# Patient Record
Sex: MALE | Race: WHITE | NOT HISPANIC OR LATINO | URBAN - METROPOLITAN AREA
[De-identification: names, ages, dates, MRNs, and addresses within clinical notes are randomized per-mention and may not be internally consistent; named-entity substitution may affect disease eponyms.]

---

## 2019-03-06 VITALS
DIASTOLIC BLOOD PRESSURE: 70 MMHG | SYSTOLIC BLOOD PRESSURE: 149 MMHG | HEART RATE: 80 BPM | WEIGHT: 169.09 LBS | OXYGEN SATURATION: 100 % | RESPIRATION RATE: 16 BRPM | TEMPERATURE: 98 F | HEIGHT: 65 IN

## 2019-03-06 NOTE — H&P ADULT - NSHPLABSRESULTS_GEN_ALL_CORE
EKG: NSR @ 75bpm, no ST elevations or T wave inversions noted. 14.0   7.68  )-----------( 273      ( 07 Mar 2019 07:35 )             42.1     137  |  99  |  15  ----------------------------<  263<H>  5.0   |  26  |  1.07    Ca    9.9      07 Mar 2019 07:35    TPro  7.4  /  Alb  4.7  /  TBili  0.5  /  DBili  <0.2  /  AST  18  /  ALT  29  /  AlkPhos  49  03-07    PT/INR - ( 07 Mar 2019 07:35 )   PT: 11.1 sec;   INR: 0.98       PTT - ( 07 Mar 2019 07:35 )  PTT:26.4 sec    CARDIAC MARKERS ( 07 Mar 2019 07:35 )  x     / x     / 27 U/L / x     / <1.0 ng/mL    Hemoglobin A1C, Whole Blood: 6.6 % (03-07-19 @ 07:35)    EKG: NSR @ 75bpm, no ST elevations or T wave inversions noted.

## 2019-03-06 NOTE — H&P ADULT - LYMPH NODES
Assessment    1  Encounter for preventive health examination (V70 0) (Z00 00)   2  Flu vaccine need (V04 81) (Z23)    Plan  Anxiety    · ALPRAZolam 0 25 MG Oral Tablet; 1/2 -1 TAB BY MOUTH ONCE or TWICE  AS  NEED   · (Q) TSH, 3RD GENERATION W/REFLEX TO FT4; Status:Active; Requested  for:08Nov2016;   Chondromalacia of right patella, Right knee pain    · * XR KNEE 3 VW RIGHT NON INJURY; Status:Active; Requested for:08Nov2016;   Flu vaccine need    · Stop: Fluzone Quadrivalent Intramuscular Suspension  Lipid screening    · (Q) LIPID PANEL WITH DIRECT LDL; Status:Active; Requested for:08Nov2016;   Nonfunctioning kidney, Renal Dysplasia Of The Left Kidney    · (Q) CBC (INCLUDES DIFF/PLT) (REFL); Status:Active; Requested for:08Nov2016;    · (Q) COMPREHENSIVE METABOLIC PANEL W/O eGFR; Status:Active; Requested  for:08Nov2016;    · (Q) URINALYSIS MICROSCOPIC; Status:Active; Requested for:08Nov2016;   Right knee pain    · 1 - Tish Artis DO (Orthopedic Surgery) Physician Referral  Consult re ongoing  right knee pain  Status: Active  Requested for: [id number]  Care Summary provided  : Yes    Discussion/Summary  health maintenance visit cervical cancer screening is current Breast cancer screening: mammogram is current  Colorectal cancer screening: colorectal cancer screening is current and 2016 redo 5 yrs  Screening lab work includes hemoglobin, glucose, lipid profile, thyroid function testing and urinalysis  The will do flu shot w work had Tdap 2015  She was advised to be evaluated by an optometrist and a dentist      She is in today for her physical, she continues ttodo well  She can use alprazolam sparingly as is    She has been limited in her exercise due to right knee pain, try to do strengthening for quads, can see orthopedist if wants  She will do fasting blood work along with urinalysis including screening lipids  Past history nonfunctioning left kidney with remote right kidney surgery      She will continue to see her gynecologist for routine care, had colonoscopy earlier this year which was fine and was advised to redo in 5 years    She will continue to try to watch a healthy diet, she is trying to limit portions, watch carbohydrate intake  She does experience some snoring no excessive daytime somnolence, see no indication for sleep study at present    We should see her every year or so, call us sooner as needed  Chief Complaint  Physical      History of Present Illness  HPI: She is in today for her regular physical, I last saw her about a year and a half ago  Overall she has been feeling well, other than some ongoing subpatellar right knee pain at times which does limit her exercise  She rarely uses Xanax as needed for anxiety, does need refill  Becoming grandmother in may      Review of Systems    Constitutional: no fever, not feeling poorly, no recent weight gain, no chills, not feeling tired and no recent weight loss  Eyes: no eyesight problems  ENT: no earache, no nosebleeds, no sore throat and no hoarseness  Cardiovascular: No complaints of slow heart rate, no fast heart rate, no chest pain, no palpitations, no leg claudication, no lower extremity edema  Respiratory: No complaints of shortness of breath, no wheezing, no cough, no SOB on exertion, no orthopnea, no PND  Gastrointestinal: No reflux, but No complaints of abdominal pain, no constipation, no nausea or vomiting, no diarrhea, no bloody stools  Genitourinary: Sees gynecology, but no dysuria  Musculoskeletal: Ongoing pain right knee subpatellar at times  Integumentary: No complaints of skin rash or lesions, no itching, no skin wounds, no breast pain or lump  Neurological: no headache, no confusion, no dizziness, no limb weakness, no convulsions, no fainting and no difficulty walking  Psychiatric: no anxiety, no sleep disturbances and no depression  Endocrine: hot flashes     Hematologic/Lymphatic: No complaints of swollen glands, no swollen glands in the neck, does not bleed easily, does not bruise easily  Active Problems    1  Anxiety (300 00) (F41 9)   2  Fatigue (780 79) (R53 83)   3  History of Kidney Surgery   4  Other specified menopausal and perimenopausal disorders (627 8) (N95 8)   5  Renal Dysplasia Of The Left Kidney (753 15)   6  Right knee pain (719 46) (M25 561)    Past Medical History    · Renal Dysplasia Of The Left Kidney (753 15)    Surgical History    · History of Kidney Surgery   · History of Wrist Surgery    Social History    · Being A Social Drinker   · Employment History: (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   · Former smoker (V15 82) (F14 534)   · Marital History - Currently     Current Meds   1  ALPRAZolam 0 25 MG Oral Tablet; 1/2 -1 TAB BY MOUTH ONCE or TWICE  AS NEED; Therapy: [id number] to (Evaluate:[id number]); Last Rx:16Apr2015 Ordered    Allergies    1  Sulfa Drugs    Vitals   Recorded: 61MHH1819 62:62WD   Systolic 724   Diastolic 72   Heart Rate 76   Height 5 ft 5 in   Weight 182 lb    BMI Calculated 30 29   BSA Calculated 1 9     Physical Exam    Constitutional   General appearance: No acute distress, well appearing and well nourished  overweight  Head and Face   Head and face: Normal   flushed cheeks  Palpation of the face and sinuses: No sinus tenderness  Eyes   Conjunctiva and lids: No swelling, erythema or discharge  Ears, Nose, Mouth, and Throat   Otoscopic examination: Tympanic membranes translucent with normal light reflex  Canals patent without erythema  Hearing: Normal     Lips, teeth, and gums: Normal, good dentition  Oropharynx: Normal with no erythema, edema, exudate or lesions  Neck   Neck: Supple, symmetric, trachea midline, no masses  Thyroid: Normal, no thyromegaly  Pulmonary   Auscultation of lungs: Clear to auscultation  Cardiovascular   Auscultation of heart: Normal rate and rhythm, normal S1 and S2, no murmurs  Carotid pulses: 2+ bilaterally    No edema    Lymphatic   Palpation of lymph nodes in neck: No lymphadenopathy  Musculoskeletal   Gait and station: Normal   Essentially nontender right knee with good range of motion, no instability, no redness or warmth  Neurologic   Cortical function: Normal mental status  Coordination: Normal finger to nose and heel to shin  Psychiatric   Judgment and insight: Normal     Orientation to person, place, and time: Normal     Recent and remote memory: Intact  Mood and affect: Normal        Results/Data  PHQ-2 Adult Depression Screening [id number] 09:47AM User, s     Test Name Result Flag Reference   PHQ-2 Adult Depression Score 0     Over the last two weeks, how often have you been bothered by any of the following problems? Little interest or pleasure in doing things: Not at all - 0  Feeling down, depressed, or hopeless: Not at all - 0   PHQ-2 Adult Depression Screening Negative         Health Management  Health Maintenance   COLONOSCOPY; every 10 years; Last [id number]; Next Due: 56MGM5927;  Active    Signatures   Electronically signed by : Shana Nelson DO; Nov 8 2016 12:24PM EST                       (Author) not examined

## 2019-03-06 NOTE — H&P ADULT - PMH
Diabetes mellitus type 2, noninsulin dependent    Diastolic dysfunction    Hyperlipidemia Diabetes mellitus type 2, noninsulin dependent    Diastolic dysfunction    HTN (hypertension)    Hyperlipidemia

## 2019-03-06 NOTE — H&P ADULT - ASSESSMENT
62 y/o Frisian speaking Male, former Smoker, with a PMHx of HTN, HLD, DM Type II, and Diastolic Dysfunction who presented to his Cardiologist, Dr. Hammonds, asymptomatic with subsequent abnormal Stress Echo 1/15/2019: revealed hypokinesis in apical lateral, septal and inferior wall, 2 mm horizontal ST depression in lead II, III, AVF, 1.5 mm ST depression in V3-V5, V6. During stress test patient had PVC, Bigeminy, and Trigeminy. Echocardiogram 1/14/2019: EF: 45-50%, trace to mild MR, trace TR, no pericardial effusion. In light of patient's risk factors and abnormal Stress Echo patient now presents for recommended cardiac cath with possible intervention.   H/H /, patient denies bleeding, GI bleeding, hematemesis, hematuria, BRBPR, and melena. Patient reports compliance with daily home Brilinta 60mg daily and Aspirin 81mg daily with last dose yesterday, ordered Brilinta 180mg and Aspirin 325mg pre-Cath.    Cr. , euvolemic on exam with history of Diastolic Dysfunction by Echo, IV hydration 9% NS @ 50cc/hr ordered pre-Cath.    ASA Class III    Mallampati Class III  Started consent with [place]  but patient ultimately refused stating that he preferred wife to translate  Risks & benefits of procedure and alternative therapy have been explained to the patient including but not limited to: allergic reaction, bleeding with possible need for blood transfusion, infection, renal and vascular compromise, limb damage, arrhythmia, stroke, vessel dissection/perforation, Myocardial Infarction, emergent CABG. Informed consent obtained and in chart.    Patient a candidate for sedation: Yes    Sedation Type: Moderate 64 y/o Belarusian speaking Male, former Smoker, with a PMHx of HTN, HLD, DM Type II, and Diastolic Dysfunction who presented to his Cardiologist, Dr. Hammonds, asymptomatic with subsequent abnormal Stress Echo 1/15/2019: revealed hypokinesis in apical lateral, septal and inferior wall, 2 mm horizontal ST depression in lead II, III, AVF, 1.5 mm ST depression in V3-V5, V6. During stress test patient had PVC, Bigeminy, and Trigeminy. Echocardiogram 1/14/2019: EF: 45-50%, trace to mild MR, trace TR, no pericardial effusion. In light of patient's risk factors and abnormal Stress Echo patient now presents for recommended cardiac cath with possible intervention.   H/H 14/42.1, patient denies bleeding, GI bleeding, hematemesis, hematuria, BRBPR, and melena. Patient reports compliance with daily home Brilinta 60mg daily and Aspirin 81mg daily with last dose yesterday, ordered Brilinta 180mg and Aspirin 325mg pre-Cath.    Cr. 1.07, euvolemic on exam with history of Diastolic Dysfunction by Echo, IV hydration 9% NS @ 50cc/hr ordered pre-Cath.    ASA Class III    Mallampati Class III  Started consent with [place]  but patient ultimately refused stating that he preferred wife to translate  Risks & benefits of procedure and alternative therapy have been explained to the patient including but not limited to: allergic reaction, bleeding with possible need for blood transfusion, infection, renal and vascular compromise, limb damage, arrhythmia, stroke, vessel dissection/perforation, Myocardial Infarction, emergent CABG. Informed consent obtained and in chart.    Patient a candidate for sedation: Yes    Sedation Type: Moderate

## 2019-03-06 NOTE — H&P ADULT - NEGATIVE NEUROLOGICAL SYMPTOMS
no syncope/no headache/no difficulty walking/no focal seizures/no tremors/no vertigo/no weakness/no generalized seizures/no paresthesias

## 2019-03-06 NOTE — H&P ADULT - NEGATIVE CARDIOVASCULAR SYMPTOMS
no peripheral edema/no dyspnea on exertion/no orthopnea/no paroxysmal nocturnal dyspnea/no palpitations/no chest pain

## 2019-03-06 NOTE — H&P ADULT - NSHPLANGTRANSLATORFT_GEN_A_CORE
[place]  # Started consent with [place]  but patient ultimately refused stating that he preferred wife to translate

## 2019-03-06 NOTE — H&P ADULT - HISTORY OF PRESENT ILLNESS
History obtained from wife Adrianna Curran (reliable) and Dr. Hammonds office note   PATIENT'S WIFE WILL BRING MEDS PLEASE UPDATE  63 Kiswahili speaking M former smoker with PMHx Hyperlipidemia, DM Type II, Diastolic Dysfunction who presented to cardiologist Dr. Hammonds for follow-up. Per patient's wife patient has been feeling well and denies C/P, SOB, palpitations, dizziness, diaphoresis, N/V, syncope, LE edema, PND or orthopnea. Stress Echo 1/15/19 revealed hypokinesis in apical lateral, septal and inferior wall, 2 mm horizontal ST depression in lead II, III, AVF, 1.5 mm ST depression in V3-V5, V6. During stress test patient had PVC, Bigeminy, and Trigeminy. Echocardiogram 1/14/19 revealed trace to mild MR, trace TR, LVEF 45-50%, no pericardial effusion. In light of patient's risk factors and abnormal stress echo patient now presents for recommended cardiac cath with possible intervention. 64 y/o Frisian speaking Male, former smoker, with PMHx Hyperlipidemia, DM Type II, Diastolic Dysfunction who presented to cardiologist Dr. Hammonds for follow-up. Per patient's wife patient has been feeling well and denies C/P, SOB, palpitations, dizziness, diaphoresis, N/V, syncope, LE edema, PND or orthopnea. Stress Echo 1/15/19 revealed hypokinesis in apical lateral, septal and inferior wall, 2 mm horizontal ST depression in lead II, III, AVF, 1.5 mm ST depression in V3-V5, V6. During stress test patient had PVC, Bigeminy, and Trigeminy. Echocardiogram 1/14/19 revealed trace to mild MR, trace TR, LVEF 45-50%, no pericardial effusion. In light of patient's risk factors and abnormal stress echo patient now presents for recommended cardiac cath with possible intervention. 64 y/o Lithuanian speaking Male, former Smoker, with no significant FHx and a PMHx of HTN, HLD, DM Type II, and Diastolic Dysfunction who presented to his Cardiologist, Dr. Hammonds, for follow-up. Patient reports feeling well and denies chest pain, SOB, palpitations, dizziness, diaphoresis, N/V, syncope, LE edema, PND or orthopnea. Stress Echo 1/15/2019: revealed hypokinesis in apical lateral, septal and inferior wall, 2 mm horizontal ST depression in lead II, III, AVF, 1.5 mm ST depression in V3-V5, V6. During stress test patient had PVC, Bigeminy, and Trigeminy. Echocardiogram 1/14/2019: EF: 45-50%, trace to mild MR, trace TR, no pericardial effusion. In light of patient's risk factors and abnormal Stress Echo patient now presents for recommended cardiac cath with possible intervention.

## 2019-03-07 ENCOUNTER — INPATIENT (INPATIENT)
Facility: HOSPITAL | Age: 64
LOS: 0 days | Discharge: HOME | DRG: 247 | End: 2019-03-08
Attending: INTERNAL MEDICINE | Admitting: INTERNAL MEDICINE
Payer: COMMERCIAL

## 2019-03-07 LAB
ALBUMIN SERPL ELPH-MCNC: 4.7 G/DL — SIGNIFICANT CHANGE UP (ref 3.3–5)
ALP SERPL-CCNC: 49 U/L — SIGNIFICANT CHANGE UP (ref 40–120)
ALT FLD-CCNC: 29 U/L — SIGNIFICANT CHANGE UP (ref 10–45)
ANION GAP SERPL CALC-SCNC: 12 MMOL/L — SIGNIFICANT CHANGE UP (ref 5–17)
APTT BLD: 26.4 SEC — LOW (ref 27.5–36.3)
AST SERPL-CCNC: 18 U/L — SIGNIFICANT CHANGE UP (ref 10–40)
BASOPHILS # BLD AUTO: 0.01 K/UL — SIGNIFICANT CHANGE UP (ref 0–0.2)
BASOPHILS NFR BLD AUTO: 0.1 % — SIGNIFICANT CHANGE UP (ref 0–2)
BILIRUB DIRECT SERPL-MCNC: <0.2 MG/DL — SIGNIFICANT CHANGE UP (ref 0–0.2)
BILIRUB INDIRECT FLD-MCNC: >0.3 MG/DL — SIGNIFICANT CHANGE UP (ref 0.2–1)
BILIRUB SERPL-MCNC: 0.5 MG/DL — SIGNIFICANT CHANGE UP (ref 0.2–1.2)
BUN SERPL-MCNC: 15 MG/DL — SIGNIFICANT CHANGE UP (ref 7–23)
CALCIUM SERPL-MCNC: 9.9 MG/DL — SIGNIFICANT CHANGE UP (ref 8.4–10.5)
CHLORIDE SERPL-SCNC: 99 MMOL/L — SIGNIFICANT CHANGE UP (ref 96–108)
CHOLEST SERPL-MCNC: 220 MG/DL — HIGH (ref 10–199)
CK MB CFR SERPL CALC: <1 NG/ML — SIGNIFICANT CHANGE UP (ref 0–6.7)
CK SERPL-CCNC: 27 U/L — LOW (ref 30–200)
CO2 SERPL-SCNC: 26 MMOL/L — SIGNIFICANT CHANGE UP (ref 22–31)
CREAT SERPL-MCNC: 1.07 MG/DL — SIGNIFICANT CHANGE UP (ref 0.5–1.3)
CRP SERPL-MCNC: 0.29 MG/DL — SIGNIFICANT CHANGE UP (ref 0–0.4)
EOSINOPHIL # BLD AUTO: 0.07 K/UL — SIGNIFICANT CHANGE UP (ref 0–0.5)
EOSINOPHIL NFR BLD AUTO: 0.9 % — SIGNIFICANT CHANGE UP (ref 0–6)
GLUCOSE SERPL-MCNC: 263 MG/DL — HIGH (ref 70–99)
HBA1C BLD-MCNC: 6.6 % — HIGH (ref 4–5.6)
HCT VFR BLD CALC: 42.1 % — SIGNIFICANT CHANGE UP (ref 39–50)
HDLC SERPL-MCNC: 45 MG/DL — SIGNIFICANT CHANGE UP
HGB BLD-MCNC: 14 G/DL — SIGNIFICANT CHANGE UP (ref 13–17)
IMM GRANULOCYTES NFR BLD AUTO: 0.3 % — SIGNIFICANT CHANGE UP (ref 0–1.5)
INR BLD: 0.98 — SIGNIFICANT CHANGE UP (ref 0.88–1.16)
LIPID PNL WITH DIRECT LDL SERPL: 145 MG/DL — HIGH
LYMPHOCYTES # BLD AUTO: 2.34 K/UL — SIGNIFICANT CHANGE UP (ref 1–3.3)
LYMPHOCYTES # BLD AUTO: 30.5 % — SIGNIFICANT CHANGE UP (ref 13–44)
MCHC RBC-ENTMCNC: 30.7 PG — SIGNIFICANT CHANGE UP (ref 27–34)
MCHC RBC-ENTMCNC: 33.3 GM/DL — SIGNIFICANT CHANGE UP (ref 32–36)
MCV RBC AUTO: 92.3 FL — SIGNIFICANT CHANGE UP (ref 80–100)
MONOCYTES # BLD AUTO: 0.55 K/UL — SIGNIFICANT CHANGE UP (ref 0–0.9)
MONOCYTES NFR BLD AUTO: 7.2 % — SIGNIFICANT CHANGE UP (ref 2–14)
NEUTROPHILS # BLD AUTO: 4.69 K/UL — SIGNIFICANT CHANGE UP (ref 1.8–7.4)
NEUTROPHILS NFR BLD AUTO: 61 % — SIGNIFICANT CHANGE UP (ref 43–77)
NRBC # BLD: 0 /100 WBCS — SIGNIFICANT CHANGE UP (ref 0–0)
PLATELET # BLD AUTO: 273 K/UL — SIGNIFICANT CHANGE UP (ref 150–400)
POTASSIUM SERPL-MCNC: 5 MMOL/L — SIGNIFICANT CHANGE UP (ref 3.5–5.3)
POTASSIUM SERPL-SCNC: 5 MMOL/L — SIGNIFICANT CHANGE UP (ref 3.5–5.3)
PROT SERPL-MCNC: 7.4 G/DL — SIGNIFICANT CHANGE UP (ref 6–8.3)
PROTHROM AB SERPL-ACNC: 11.1 SEC — SIGNIFICANT CHANGE UP (ref 10–12.9)
RBC # BLD: 4.56 M/UL — SIGNIFICANT CHANGE UP (ref 4.2–5.8)
RBC # FLD: 11.9 % — SIGNIFICANT CHANGE UP (ref 10.3–14.5)
SODIUM SERPL-SCNC: 137 MMOL/L — SIGNIFICANT CHANGE UP (ref 135–145)
TOTAL CHOLESTEROL/HDL RATIO MEASUREMENT: 4.9 RATIO — SIGNIFICANT CHANGE UP (ref 3.4–9.6)
TRIGL SERPL-MCNC: 151 MG/DL — HIGH (ref 10–149)
WBC # BLD: 7.68 K/UL — SIGNIFICANT CHANGE UP (ref 3.8–10.5)
WBC # FLD AUTO: 7.68 K/UL — SIGNIFICANT CHANGE UP (ref 3.8–10.5)

## 2019-03-07 PROCEDURE — 99222 1ST HOSP IP/OBS MODERATE 55: CPT | Mod: 25

## 2019-03-07 PROCEDURE — 93458 L HRT ARTERY/VENTRICLE ANGIO: CPT | Mod: 26,XU

## 2019-03-07 PROCEDURE — 93571 IV DOP VEL&/PRESS C FLO 1ST: CPT | Mod: 26

## 2019-03-07 PROCEDURE — 92920 PRQ TRLUML C ANGIOP 1ART&/BR: CPT | Mod: RI

## 2019-03-07 RX ORDER — SITAGLIPTIN 50 MG/1
1 TABLET, FILM COATED ORAL
Qty: 0 | Refills: 0 | COMMUNITY

## 2019-03-07 RX ORDER — INSULIN LISPRO 100/ML
VIAL (ML) SUBCUTANEOUS
Qty: 0 | Refills: 0 | Status: DISCONTINUED | OUTPATIENT
Start: 2019-03-07 | End: 2019-03-08

## 2019-03-07 RX ORDER — TICAGRELOR 90 MG/1
90 TABLET ORAL
Qty: 0 | Refills: 0 | Status: DISCONTINUED | OUTPATIENT
Start: 2019-03-07 | End: 2019-03-07

## 2019-03-07 RX ORDER — TICAGRELOR 90 MG/1
180 TABLET ORAL ONCE
Qty: 0 | Refills: 0 | Status: COMPLETED | OUTPATIENT
Start: 2019-03-07 | End: 2019-03-07

## 2019-03-07 RX ORDER — SODIUM CHLORIDE 9 MG/ML
1000 INJECTION, SOLUTION INTRAVENOUS
Qty: 0 | Refills: 0 | Status: DISCONTINUED | OUTPATIENT
Start: 2019-03-07 | End: 2019-03-08

## 2019-03-07 RX ORDER — GLYBURIDE-METFORMIN HYDROCHLORIDE 1.25; 25 MG/1; MG/1
1 TABLET ORAL
Qty: 0 | Refills: 0 | COMMUNITY

## 2019-03-07 RX ORDER — SODIUM CHLORIDE 9 MG/ML
500 INJECTION INTRAMUSCULAR; INTRAVENOUS; SUBCUTANEOUS
Qty: 0 | Refills: 0 | Status: DISCONTINUED | OUTPATIENT
Start: 2019-03-07 | End: 2019-03-07

## 2019-03-07 RX ORDER — CHLORHEXIDINE GLUCONATE 213 G/1000ML
1 SOLUTION TOPICAL ONCE
Qty: 0 | Refills: 0 | Status: DISCONTINUED | OUTPATIENT
Start: 2019-03-07 | End: 2019-03-07

## 2019-03-07 RX ORDER — DEXTROSE 50 % IN WATER 50 %
25 SYRINGE (ML) INTRAVENOUS ONCE
Qty: 0 | Refills: 0 | Status: DISCONTINUED | OUTPATIENT
Start: 2019-03-07 | End: 2019-03-08

## 2019-03-07 RX ORDER — ATORVASTATIN CALCIUM 80 MG/1
80 TABLET, FILM COATED ORAL AT BEDTIME
Qty: 0 | Refills: 0 | Status: DISCONTINUED | OUTPATIENT
Start: 2019-03-07 | End: 2019-03-08

## 2019-03-07 RX ORDER — TICAGRELOR 90 MG/1
90 TABLET ORAL
Qty: 0 | Refills: 0 | Status: DISCONTINUED | OUTPATIENT
Start: 2019-03-07 | End: 2019-03-08

## 2019-03-07 RX ORDER — SODIUM CHLORIDE 9 MG/ML
500 INJECTION INTRAMUSCULAR; INTRAVENOUS; SUBCUTANEOUS
Qty: 0 | Refills: 0 | Status: DISCONTINUED | OUTPATIENT
Start: 2019-03-07 | End: 2019-03-08

## 2019-03-07 RX ORDER — ASPIRIN/CALCIUM CARB/MAGNESIUM 324 MG
81 TABLET ORAL DAILY
Qty: 0 | Refills: 0 | Status: DISCONTINUED | OUTPATIENT
Start: 2019-03-08 | End: 2019-03-08

## 2019-03-07 RX ORDER — ATORVASTATIN CALCIUM 80 MG/1
40 TABLET, FILM COATED ORAL AT BEDTIME
Qty: 0 | Refills: 0 | Status: DISCONTINUED | OUTPATIENT
Start: 2019-03-07 | End: 2019-03-07

## 2019-03-07 RX ORDER — DEXTROSE 50 % IN WATER 50 %
12.5 SYRINGE (ML) INTRAVENOUS ONCE
Qty: 0 | Refills: 0 | Status: DISCONTINUED | OUTPATIENT
Start: 2019-03-07 | End: 2019-03-08

## 2019-03-07 RX ORDER — ASPIRIN/CALCIUM CARB/MAGNESIUM 324 MG
325 TABLET ORAL ONCE
Qty: 0 | Refills: 0 | Status: COMPLETED | OUTPATIENT
Start: 2019-03-07 | End: 2019-03-07

## 2019-03-07 RX ORDER — LOVASTATIN 20 MG
1 TABLET ORAL
Qty: 0 | Refills: 0 | COMMUNITY

## 2019-03-07 RX ORDER — ASPIRIN/CALCIUM CARB/MAGNESIUM 324 MG
81 TABLET ORAL ONCE
Qty: 0 | Refills: 0 | Status: DISCONTINUED | OUTPATIENT
Start: 2019-03-07 | End: 2019-03-07

## 2019-03-07 RX ORDER — GLUCAGON INJECTION, SOLUTION 0.5 MG/.1ML
1 INJECTION, SOLUTION SUBCUTANEOUS ONCE
Qty: 0 | Refills: 0 | Status: DISCONTINUED | OUTPATIENT
Start: 2019-03-07 | End: 2019-03-08

## 2019-03-07 RX ORDER — INFLUENZA VIRUS VACCINE 15; 15; 15; 15 UG/.5ML; UG/.5ML; UG/.5ML; UG/.5ML
0.5 SUSPENSION INTRAMUSCULAR ONCE
Qty: 0 | Refills: 0 | Status: COMPLETED | OUTPATIENT
Start: 2019-03-07 | End: 2019-03-07

## 2019-03-07 RX ORDER — DEXTROSE 50 % IN WATER 50 %
15 SYRINGE (ML) INTRAVENOUS ONCE
Qty: 0 | Refills: 0 | Status: DISCONTINUED | OUTPATIENT
Start: 2019-03-07 | End: 2019-03-08

## 2019-03-07 RX ORDER — RANOLAZINE 500 MG/1
500 TABLET, FILM COATED, EXTENDED RELEASE ORAL
Qty: 0 | Refills: 0 | Status: DISCONTINUED | OUTPATIENT
Start: 2019-03-07 | End: 2019-03-08

## 2019-03-07 RX ORDER — INSULIN LISPRO 100/ML
VIAL (ML) SUBCUTANEOUS ONCE
Qty: 0 | Refills: 0 | Status: COMPLETED | OUTPATIENT
Start: 2019-03-07 | End: 2019-03-07

## 2019-03-07 RX ADMIN — Medication 325 MILLIGRAM(S): at 08:34

## 2019-03-07 RX ADMIN — TICAGRELOR 180 MILLIGRAM(S): 90 TABLET ORAL at 08:34

## 2019-03-07 RX ADMIN — TICAGRELOR 90 MILLIGRAM(S): 90 TABLET ORAL at 22:18

## 2019-03-07 RX ADMIN — RANOLAZINE 500 MILLIGRAM(S): 500 TABLET, FILM COATED, EXTENDED RELEASE ORAL at 18:38

## 2019-03-07 RX ADMIN — Medication 1: at 16:55

## 2019-03-07 RX ADMIN — ATORVASTATIN CALCIUM 80 MILLIGRAM(S): 80 TABLET, FILM COATED ORAL at 22:18

## 2019-03-07 RX ADMIN — SODIUM CHLORIDE 50 MILLILITER(S): 9 INJECTION INTRAMUSCULAR; INTRAVENOUS; SUBCUTANEOUS at 08:37

## 2019-03-07 RX ADMIN — Medication 3: at 08:42

## 2019-03-07 NOTE — PATIENT PROFILE ADULT - NSPROHMSYMPCOND_GEN_A_NUR
none How Severe Is Your Skin Lesion?: mild Have Your Skin Lesions Been Treated?: not been treated Is This A New Presentation, Or A Follow-Up?: Skin Lesions

## 2019-03-07 NOTE — CHART NOTE - NSCHARTNOTEFT_GEN_A_CORE
Called to patient bedside by RN ~ 12:45 PM for right groin hematoma. Patient s/p FLORENTIN Ramus with failed PC x 1 and 2nd perclose successful. Upon arrival by PA right groin bleeding. ’s/60s HR 70s O2 sat 97% RA. Manual pressure held for 10 minutes with resolution of hematoma and bleed. During manual pressure patient experienced a vasovagal episode. Patient reported nausea and dizziness BP 77/44 Hr decreased to 50’s. Wide open fluids given and patient placed in Trendelenburg position. Atropine 0.6 mg IV x 1 given and repeat VS BP 65/45 therefore 100 mcg of Neosynephrine given. BP improved to 113/65. Patient instructed not to bend right leg nor lift his head and verbalized understanding. Patient also instructed to alert staff immediately if he notes bleeding from groin or symptoms recur. Patient verbalized understanding. Bedrest extended until 3;30 PM. Dr. Faulkner and 5 Cristian PAs made aware. Continue to monitor.

## 2019-03-07 NOTE — CONSULT NOTE ADULT - SUBJECTIVE AND OBJECTIVE BOX
Preventive Cardiology Consultation     Cardiologist - Dr. Hammonds     CHIEF COMPLAINT: s/p cardiac catheterization requiring cardiovascular prevention optimization and education    HISTORY OF PRESENT ILLNESS: 62 y/o Male, former Smoker, with no significant FHx and a PMHx of HTN, HLD, DM Type II, and Diastolic Dysfunction. Patient is now s/p cardiac cath today 3/7/19: s/p PTCA/FLORENTIN pRamus (90%->0%), mRCA 50% (FFR 0.90). Coronary Angiography: LMCA: widely patent, LAD: luminal irregularities, LCx: mLCx 100% occlusion as a  collaterals from RCA RCA: dominant, midRCA 50% (FFR:0.90). Ramus: 90% proximal stenosis. EF 60%.     Review of systems otherwise negative.     Lifestyle History:  Mediterranean Diet Score (9 question survey) was 5.   (8-9: optimal, 6-7: near-optimal, 4-5: suboptimal, 0-3: markedly suboptimal)  Exercise: Patient denies any regular exercise other than walking necessary for daily activities.   Smoking: Former smoker (0.25 PPD x 20 years; quit 15 years ago).   Stress: Patient denies any stress.     PAST MEDICAL & SURGICAL HISTORY:  HTN (hypertension)  Diabetes mellitus type 2, noninsulin dependent  Diastolic dysfunction  Hyperlipidemia  No significant past surgical history    FAMILY HISTORY:   Patient denies any first degree family history of premature CAD or CVA.     Allergies:   No Known Allergies    HOME MEDICATIONS:     aspirin 81 mg oral tablet: 1 tab(s) orally once a day (07 Mar 2019 07:35)  Brilinta (ticagrelor) 60 mg oral tablet: 1 tab(s) orally 2 times a day (07 Mar 2019 07:35)  glyburide-metformin 2.5 mg-500 mg oral tablet: 1 tab(s) orally once a day (07 Mar 2019 07:35)  Januvia 50 mg oral tablet: 1 tab(s) orally once a day (07 Mar 2019 07:35)  lisinopril 20 mg oral tablet: 1 tab(s) orally once a day (07 Mar 2019 07:35)  lovastatin 40 mg oral tablet: 1 tab(s) orally once a day (07 Mar 2019 07:35)  Ranexa 500 mg oral tablet, extended release: 1 tab(s) orally 2 times a day (07 Mar 2019 07:35)      PHYSICAL EXAM:  HR: 74 (03-07-19 @ 17:01) (72 - 75)  BP: 128/59 (03-07-19 @ 17:01) (113/56 - 128/59)  RR: 18 (03-07-19 @ 17:01) (18 - 18)  Height (cm): 165.1 (03-07 @ 15:46)  Weight (kg): 76.2 (03-07 @ 15:46)  BMI (kg/m2): 28 (03-07 @ 15:46)  	  Gen- awake, conversive  Head-NCAT; eyes: no corneal arcus noted b/l; no xanthelasmas   Neck- no thyromegaly, no cervical LAD, no JVD, no carotid bruit b/l  Respiratory- good air entry b/l, no WRR  Cardiovascular- S1S2, RRR, no MRG appr, no LE edema b/l, distal pulses 2+ b/l  Gastrointestinal- no HSM, no masses  Neurology- moves all extremities, no focal deficits  Psych- normal affect, non-depressed mood  Skin- no lesions, no rashes, no xanthomas     LABS:	                        14.0   7.68  )-----------( 273      ( 07 Mar 2019 07:35 )             42.1     03-07    137  |  99  |  15  ----------------------------<  263<H>  5.0   |  26  |  1.07    Ca    9.9      07 Mar 2019 07:35    TPro  7.4  /  Alb  4.7  /  TBili  0.5  /  DBili  <0.2  /  AST  18  /  ALT  29  /  AlkPhos  49  03-07      Hemoglobin A1C, Whole Blood: 6.6 % (03-07 @ 07:35)      Cholesterol, Serum: 220 mg/dL (03-07 @ 07:35)  HDL Cholesterol, Serum: 45 mg/dL (03-07 @ 07:35)  Triglycerides, Serum: 151 mg/dL (03-07 @ 07:35)  Direct LDL: 145 mg/dL (03-07 @ 07:35)    C-Reactive Protein, Serum: 0.29 mg/dL (03-07 @ 07:35)      ASSESSMENT/RECOMMENDATIONS: 	  Patient's dietary, exercise and overall lifestyle habits were reviewed. The concept of atherosclerosis and its systemic nature was discussed with a focus on the need to get all cardiovascular risk factors to goal. At this time, I would like to make the following recommendations to optimize atherosclerotic risk factors.     RECOMMENDATIONS:   Anti-platelet Therapy: DAPT per interventionalist recommendation.   Lipid Therapy: High dose statin therapy would likely benefit this patient. Patient is currently taking lovastatin 40mg daily and is compliant and tolerating it well. Depending on improvements from lifestyle modifications, it would be reasonable to consider increasing his statin dosage, but we do recommend adding Zetia 10mg daily at this time to further optimize his medication regimen and improve his lipid profile.   Hypertension: Blood pressures during this stay were well-controlled.   Mediterranean Diet Score is 5. Some suggestions include continue incorporating 2 or more servings per day of vegetables, fruits, and whole grains. Increase intake of fish and legumes/beans to 2 or more servings per week. Aim to increase intake of healthy fats, such as olive oil and avocados, and have a handful of nuts/seeds most days. Reduce red/processed meat consumption to 2 or fewer times per week.   Exercise: Recommended gradually increasing activity to 30-45 minutes most days of the week once cleared by referring cardiologist. Cardiac rehab might benefit this patient and is covered by major insurance plans (other than co-pays), please refer.   Medication Adherence: Patient has no issues with adherence at this time.   Smoking: This patient is a non-smoker.   Obesity/Overweight: The patient was advised about specific mechanisms such as reduced portions and increased activity for efforts toward weight loss.   Glucometabolic State: Patient's blood sugar is well-controlled at this time. Recent HbA1c was 6.6%. Depending on discussions with patient's PCP and/or endocrinologist, we would recommend the possibility of incorporating a GLP-1 agonist or SGLT-2 inhibitor, as these newer agents have cardiovascular benefits as well as glucose control.   Sleep Apnea: The patient is at low risk for sleep apnea.   Psychological Stress: The patient appears to be coping with stressors well at this time.     Thank you for the opportunity to see this patient. Please feel free to contact Prevention if there are any questions, or if you feel that your patient would benefit from continued follow-up visits with the Program.    I am acting as a scribe on behalf of Dr. Blanca Munroe,    Betzy Haddad, [facility]  Cardiovascular Prevention     Blanca Munroe MD  System Director, Cardiovascular Prevention

## 2019-03-08 VITALS — TEMPERATURE: 98 F

## 2019-03-08 LAB
ANION GAP SERPL CALC-SCNC: 9 MMOL/L — SIGNIFICANT CHANGE UP (ref 5–17)
BASOPHILS # BLD AUTO: 0.02 K/UL — SIGNIFICANT CHANGE UP (ref 0–0.2)
BASOPHILS NFR BLD AUTO: 0.2 % — SIGNIFICANT CHANGE UP (ref 0–2)
BUN SERPL-MCNC: 15 MG/DL — SIGNIFICANT CHANGE UP (ref 7–23)
CALCIUM SERPL-MCNC: 9.7 MG/DL — SIGNIFICANT CHANGE UP (ref 8.4–10.5)
CHLORIDE SERPL-SCNC: 103 MMOL/L — SIGNIFICANT CHANGE UP (ref 96–108)
CO2 SERPL-SCNC: 27 MMOL/L — SIGNIFICANT CHANGE UP (ref 22–31)
CREAT SERPL-MCNC: 1.2 MG/DL — SIGNIFICANT CHANGE UP (ref 0.5–1.3)
EOSINOPHIL # BLD AUTO: 0.1 K/UL — SIGNIFICANT CHANGE UP (ref 0–0.5)
EOSINOPHIL NFR BLD AUTO: 1 % — SIGNIFICANT CHANGE UP (ref 0–6)
GLUCOSE SERPL-MCNC: 211 MG/DL — HIGH (ref 70–99)
HCT VFR BLD CALC: 39.3 % — SIGNIFICANT CHANGE UP (ref 39–50)
HGB BLD-MCNC: 13.1 G/DL — SIGNIFICANT CHANGE UP (ref 13–17)
IMM GRANULOCYTES NFR BLD AUTO: 0.4 % — SIGNIFICANT CHANGE UP (ref 0–1.5)
LYMPHOCYTES # BLD AUTO: 3.14 K/UL — SIGNIFICANT CHANGE UP (ref 1–3.3)
LYMPHOCYTES # BLD AUTO: 30.1 % — SIGNIFICANT CHANGE UP (ref 13–44)
MAGNESIUM SERPL-MCNC: 1.8 MG/DL — SIGNIFICANT CHANGE UP (ref 1.6–2.6)
MCHC RBC-ENTMCNC: 31 PG — SIGNIFICANT CHANGE UP (ref 27–34)
MCHC RBC-ENTMCNC: 33.3 GM/DL — SIGNIFICANT CHANGE UP (ref 32–36)
MCV RBC AUTO: 92.9 FL — SIGNIFICANT CHANGE UP (ref 80–100)
MONOCYTES # BLD AUTO: 0.79 K/UL — SIGNIFICANT CHANGE UP (ref 0–0.9)
MONOCYTES NFR BLD AUTO: 7.6 % — SIGNIFICANT CHANGE UP (ref 2–14)
NEUTROPHILS # BLD AUTO: 6.34 K/UL — SIGNIFICANT CHANGE UP (ref 1.8–7.4)
NEUTROPHILS NFR BLD AUTO: 60.7 % — SIGNIFICANT CHANGE UP (ref 43–77)
NRBC # BLD: 0 /100 WBCS — SIGNIFICANT CHANGE UP (ref 0–0)
PLATELET # BLD AUTO: 269 K/UL — SIGNIFICANT CHANGE UP (ref 150–400)
POTASSIUM SERPL-MCNC: 4.8 MMOL/L — SIGNIFICANT CHANGE UP (ref 3.5–5.3)
POTASSIUM SERPL-SCNC: 4.8 MMOL/L — SIGNIFICANT CHANGE UP (ref 3.5–5.3)
RBC # BLD: 4.23 M/UL — SIGNIFICANT CHANGE UP (ref 4.2–5.8)
RBC # FLD: 11.9 % — SIGNIFICANT CHANGE UP (ref 10.3–14.5)
SODIUM SERPL-SCNC: 139 MMOL/L — SIGNIFICANT CHANGE UP (ref 135–145)
WBC # BLD: 10.43 K/UL — SIGNIFICANT CHANGE UP (ref 3.8–10.5)
WBC # FLD AUTO: 10.43 K/UL — SIGNIFICANT CHANGE UP (ref 3.8–10.5)

## 2019-03-08 PROCEDURE — 99239 HOSP IP/OBS DSCHRG MGMT >30: CPT

## 2019-03-08 PROCEDURE — 99232 SBSQ HOSP IP/OBS MODERATE 35: CPT | Mod: 25

## 2019-03-08 RX ORDER — LOVASTATIN 20 MG
1 TABLET ORAL
Qty: 0 | Refills: 0 | COMMUNITY

## 2019-03-08 RX ORDER — TICAGRELOR 90 MG/1
1 TABLET ORAL
Qty: 0 | Refills: 0 | COMMUNITY

## 2019-03-08 RX ORDER — TICAGRELOR 90 MG/1
1 TABLET ORAL
Qty: 60 | Refills: 0 | OUTPATIENT
Start: 2019-03-08 | End: 2019-04-06

## 2019-03-08 RX ORDER — RANOLAZINE 500 MG/1
1 TABLET, FILM COATED, EXTENDED RELEASE ORAL
Qty: 0 | Refills: 0 | COMMUNITY

## 2019-03-08 RX ORDER — TICAGRELOR 90 MG/1
1 TABLET ORAL
Qty: 60 | Refills: 11 | OUTPATIENT
Start: 2019-03-08 | End: 2020-03-01

## 2019-03-08 RX ORDER — RANOLAZINE 500 MG/1
1 TABLET, FILM COATED, EXTENDED RELEASE ORAL
Qty: 60 | Refills: 3 | OUTPATIENT
Start: 2019-03-08 | End: 2019-07-05

## 2019-03-08 RX ORDER — ASPIRIN/CALCIUM CARB/MAGNESIUM 324 MG
1 TABLET ORAL
Qty: 0 | Refills: 0 | COMMUNITY

## 2019-03-08 RX ORDER — ASPIRIN/CALCIUM CARB/MAGNESIUM 324 MG
1 TABLET ORAL
Qty: 30 | Refills: 11 | OUTPATIENT
Start: 2019-03-08 | End: 2020-03-01

## 2019-03-08 RX ORDER — LISINOPRIL 2.5 MG/1
1 TABLET ORAL
Qty: 0 | Refills: 0 | COMMUNITY

## 2019-03-08 RX ORDER — ATORVASTATIN CALCIUM 80 MG/1
1 TABLET, FILM COATED ORAL
Qty: 30 | Refills: 3 | OUTPATIENT
Start: 2019-03-08 | End: 2019-07-05

## 2019-03-08 RX ORDER — LISINOPRIL 2.5 MG/1
1 TABLET ORAL
Qty: 30 | Refills: 3 | OUTPATIENT
Start: 2019-03-08 | End: 2019-07-05

## 2019-03-08 RX ORDER — TICAGRELOR 90 MG/1
1 TABLET ORAL
Qty: 30 | Refills: 11 | OUTPATIENT
Start: 2019-03-08 | End: 2020-03-01

## 2019-03-08 RX ADMIN — Medication 81 MILLIGRAM(S): at 12:06

## 2019-03-08 RX ADMIN — Medication 1: at 07:20

## 2019-03-08 RX ADMIN — TICAGRELOR 90 MILLIGRAM(S): 90 TABLET ORAL at 06:46

## 2019-03-08 RX ADMIN — RANOLAZINE 500 MILLIGRAM(S): 500 TABLET, FILM COATED, EXTENDED RELEASE ORAL at 06:46

## 2019-03-08 RX ADMIN — Medication 2: at 12:06

## 2019-03-08 NOTE — DISCHARGE NOTE PROVIDER - HOSPITAL COURSE
62 y/o Frisian speaking Male, former Smoker, with no significant FHx and a PMHx of HTN, HLD, DM Type II, and Diastolic Dysfunction who presented to his Cardiologist, Dr. Hammonds, for follow-up. Patient reports feeling well and denies chest pain, SOB, palpitations, dizziness, diaphoresis, N/V, syncope, LE edema, PND or orthopnea. Stress Echo 1/15/2019: revealed hypokinesis in apical lateral, septal and inferior wall, 2 mm horizontal ST depression in lead II, III, AVF, 1.5 mm ST depression in V3-V5, V6. During stress test patient had PVC, Bigeminy, and Trigeminy. Echocardiogram 1/14/2019: EF: 45-50%, trace to mild MR, trace TR, no pericardial effusion. In light of patient's risk factors and abnormal Stress Echo patient now presents for recommended cardiac cath with possible intervention.         Pt is s/p cath 3/07 received PTCA/FLORENTIN to [facility]. R groin perclosed, failed perclose x1 and 2nd perclose successful, s/p arriving to holding area pt with right groin bleed, manual pressure held for 10 minutes with resolution of hematoma and bleed. During manual pressure patient experienced vasovagal episode. Patient reported nausea and dizziness with dop in BP 77/44 HR 50s, wide open fluids given and patient placed in Trendelenburg position Atropine 0.6mg IV x1 given and repeat BP 65/45 therefore 100mcg of Alessandro-Synephrine given with improvement of /65. Overnight, no events. This AM, feels well, denies CP, SOB, n/v/d, LE edema. VSS, no events on tele, labs stable PE sig for R groin site stable c/d/i, slight  bruising but no bleeding, pain or hematoma. Patient was seen and examined by attending who agrees with above d/c plan. All meds rx to pharmacy and explained to patient. Patient instructed to return if sx worsen including not limited to chest pain, shortness of breath.

## 2019-03-08 NOTE — DISCHARGE NOTE NURSING/CASE MANAGEMENT/SOCIAL WORK - NSDCDPATPORTLINK_GEN_ALL_CORE
You can access the [email or web address] Patient Portal, offered by [facility], by registering with the following website: [email or web address]

## 2019-03-08 NOTE — PROGRESS NOTE ADULT - SUBJECTIVE AND OBJECTIVE BOX
INTERVENTIONAL CARDIOLOGY FOLLOW UP NOTE    -Patient seen and examined this am    -No events overnight    -No complaints this am    VASCULAR ACCESS EXAM:    FEMORAL:    2+ right/left femoral pulse    2+ DP/PT pulses.    -Access site clean, non-tender, without ecchymosis or hematoma.    RADIAL:    2+ right/left radial pulse    Normal capillary refill. No evidence of motor or sensory deficits of digits, hand, wrist or forearm.    -Access site clean, non-tender, without ecchymosis or hematoma.    A/P: 64 y/o Male, former Smoker, with no significant FHx and a PMHx of HTN, HLD, DM Type II, and Diastolic Dysfunction now s/p PCI of prox Ramus with FLORENTIN via right femoral approach with no evidence of vascular complications post procedure.    -continue with current medications including dual antiplatelet therapy    -discharge instructions as per protocol    -outpatient follow up with primary cardiologist

## 2019-03-08 NOTE — DISCHARGE NOTE PROVIDER - NSDCCPCAREPLAN_GEN_ALL_CORE_FT
PRINCIPAL DISCHARGE DIAGNOSIS  Problem: Coronary artery disease  Assessment and Plan of Treatment: PRINCIPAL DISCHARGE DIAGNOSIS  Problem: Coronary artery disease  Assessment and Plan of Treatment: You have a diagnosis of coronary artery disease and have been started on daily aspirin and Brilinta. You have a diagnosis of coronary artery disease and received a stent to your coronary artery. You should continue daily aspirin and Brilinta to ensure your stent does not close. DO NOT STOP THESE MEDICATIONS FOR ANY REASON UNLESS OTHERWISE INDICATED BY YOUR CARDIOLOGIST BECAUSE THIS WILL PUT YOU AT RISK FOR A HEART ATTACK. You should refrain from strenuous activity and heavy lifting for 1 week. Please make a follow up appointment with your cardiologist within 1-2 weeks of your discharge. All of your prescriptions have been sent electronically to your pharmacy.   -Continue aspirin 81mg orally daily, Brilinta 90mg orally twice daily, your home lovastatin was changed to atorvastatin 80mg orally at bedtime, and Ranexa 500mg orally twice daily  -Please follow up with Dr. Hammonds in 1-2 weeks      SECONDARY DISCHARGE DIAGNOSES  Problem: Hypertension  Assessment and Plan of Treatment: You have a history of elevated blood pressure and you should continue your blood pressure medications as prescribed.   -continue lisinopril 20mg orally daily  -monitor blood pressur daily with goal <140/90    Problem: Diabetes  Assessment and Plan of Treatment: You have diabetes. DO NOT RESTART YOUR GYBURIDE-METFORMIN DUE TO THE ABILITY OF ONE OF THE COMPONENTS OF THIS MEDICATION TO INTERACT WITH YOUR CONTRAST DYE GIVEN DURING YOUR PROCEDURE  - PRINCIPAL DISCHARGE DIAGNOSIS  Problem: Coronary artery disease  Assessment and Plan of Treatment: You have a diagnosis of coronary artery disease and have been started on daily aspirin and Brilinta. You have a diagnosis of coronary artery disease and received a stent to your coronary artery. You should continue daily aspirin and Brilinta to ensure your stent does not close. DO NOT STOP THESE MEDICATIONS FOR ANY REASON UNLESS OTHERWISE INDICATED BY YOUR CARDIOLOGIST BECAUSE THIS WILL PUT YOU AT RISK FOR A HEART ATTACK. You should refrain from strenuous activity and heavy lifting for 1 week. Please make a follow up appointment with your cardiologist within 1-2 weeks of your discharge. All of your prescriptions have been sent electronically to your pharmacy.   -Continue aspirin 81mg orally daily, Brilinta 90mg orally twice daily, your home lovastatin was changed to atorvastatin 80mg orally at bedtime, and Ranexa 500mg orally twice daily  -Please follow up with Dr. Hammonds in 1-2 weeks      SECONDARY DISCHARGE DIAGNOSES  Problem: Hypertension  Assessment and Plan of Treatment: You have a history of elevated blood pressure and you should continue your blood pressure medications as prescribed.   -continue lisinopril 20mg orally daily  -monitor blood pressur daily with goal <140/90    Problem: Diabetes  Assessment and Plan of Treatment: You have diabetes. DO NOT RESTART YOUR GYBURIDE-METFORMIN DUE TO THE ABILITY OF ONE OF THE COMPONENTS OF THIS MEDICATION TO INTERACT WITH YOUR CONTRAST DYE GIVEN DURING YOUR PROCEDURE  -You may take rest of medications as prescribed'  -monitor blood sugar daily with finger sticks

## 2019-03-08 NOTE — DISCHARGE NOTE PROVIDER - CARE PROVIDER_API CALL
Lisa Hammonds (MD)  Cardiovascular Disease  [address] [place] [address]  Phone: [phone]  Fax: [phone]  Follow Up Time:

## 2019-03-08 NOTE — DISCHARGE NOTE PROVIDER - INSTRUCTIONS
Please continue a heart healthy diet low in sodium, cholesterol, and fat.   You underwent a coronary angiogram and should wait 3 days before returning to ordinary activities. Do not drive for 2 days. Consult your doctor before returning to vigorous activity. You may return to work in 3-5 days. The catheter from your groin was removed and you should remove the dressing in 24 hours. You may shower once the dressing is removed, but avoid baths, hot tubs, or swimming for 5 days to prevent infection. If you notice bleeding from the site, hardening and pain at the site, drainage or redness from the site, coolness/paleness of the extremity, swelling, or fever, please call [phone]. Please continue your aspirin and Brilinta as prescribed unless otherwise indicated by your cardiologist. All of your prescriptions have been sent to the pharmacy. Please follow up with Dr. Hammonds in 1-2 weeks. All of your needed prescriptions have been sent

## 2019-03-11 DIAGNOSIS — E78.5 HYPERLIPIDEMIA, UNSPECIFIED: ICD-10-CM

## 2019-03-11 DIAGNOSIS — I11.9 HYPERTENSIVE HEART DISEASE WITHOUT HEART FAILURE: ICD-10-CM
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
## 2019-03-11 DIAGNOSIS — R55 SYNCOPE AND COLLAPSE: ICD-10-CM

## 2019-03-11 DIAGNOSIS — I25.110 ATHEROSCLEROTIC HEART DISEASE OF NATIVE CORONARY ARTERY WITH UNSTABLE ANGINA PECTORIS: ICD-10-CM

## 2019-03-11 DIAGNOSIS — I25.82 CHRONIC TOTAL OCCLUSION OF CORONARY ARTERY: ICD-10-CM

## 2019-03-11 DIAGNOSIS — I51.9 HEART DISEASE, UNSPECIFIED: ICD-10-CM

## 2019-03-11 DIAGNOSIS — E11.9 TYPE 2 DIABETES MELLITUS WITHOUT COMPLICATIONS: ICD-10-CM

## 2019-03-11 DIAGNOSIS — Z87.891 PERSONAL HISTORY OF NICOTINE DEPENDENCE: ICD-10-CM

## 2019-03-11 DIAGNOSIS — Z79.84 LONG TERM (CURRENT) USE OF ORAL HYPOGLYCEMIC DRUGS: ICD-10-CM

## 2019-03-11 DIAGNOSIS — I97.630 POSTPROCEDURAL HEMATOMA OF A CIRCULATORY SYSTEM ORGAN OR STRUCTURE FOLLOWING A CARDIAC CATHETERIZATION: ICD-10-CM
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
## 2019-10-31 PROCEDURE — C1725: CPT

## 2019-10-31 PROCEDURE — 80061 LIPID PANEL: CPT

## 2019-10-31 PROCEDURE — C1760: CPT

## 2019-10-31 PROCEDURE — C1894: CPT

## 2019-10-31 PROCEDURE — 85610 PROTHROMBIN TIME: CPT

## 2019-10-31 PROCEDURE — 82550 ASSAY OF CK (CPK): CPT

## 2019-10-31 PROCEDURE — C1769: CPT

## 2019-10-31 PROCEDURE — 82962 GLUCOSE BLOOD TEST: CPT

## 2019-10-31 PROCEDURE — C1889: CPT

## 2019-10-31 PROCEDURE — 80076 HEPATIC FUNCTION PANEL: CPT

## 2019-10-31 PROCEDURE — C1887: CPT

## 2019-10-31 PROCEDURE — 83735 ASSAY OF MAGNESIUM: CPT

## 2019-10-31 PROCEDURE — 36415 COLL VENOUS BLD VENIPUNCTURE: CPT

## 2019-10-31 PROCEDURE — 80048 BASIC METABOLIC PNL TOTAL CA: CPT

## 2019-10-31 PROCEDURE — 85730 THROMBOPLASTIN TIME PARTIAL: CPT

## 2019-10-31 PROCEDURE — 82553 CREATINE MB FRACTION: CPT

## 2019-10-31 PROCEDURE — 86140 C-REACTIVE PROTEIN: CPT

## 2019-10-31 PROCEDURE — C1874: CPT

## 2019-10-31 PROCEDURE — 85025 COMPLETE CBC W/AUTO DIFF WBC: CPT

## 2019-10-31 PROCEDURE — 83036 HEMOGLOBIN GLYCOSYLATED A1C: CPT
